# Patient Record
Sex: MALE
[De-identification: names, ages, dates, MRNs, and addresses within clinical notes are randomized per-mention and may not be internally consistent; named-entity substitution may affect disease eponyms.]

---

## 2019-06-18 PROBLEM — Z00.00 ENCOUNTER FOR PREVENTIVE HEALTH EXAMINATION: Status: ACTIVE | Noted: 2019-06-18

## 2019-06-24 ENCOUNTER — APPOINTMENT (OUTPATIENT)
Dept: MAMMOGRAPHY | Facility: CLINIC | Age: 49
End: 2019-06-24
Payer: COMMERCIAL

## 2019-06-24 ENCOUNTER — APPOINTMENT (OUTPATIENT)
Dept: ULTRASOUND IMAGING | Facility: CLINIC | Age: 49
End: 2019-06-24

## 2019-06-24 PROCEDURE — 77066 DX MAMMO INCL CAD BI: CPT

## 2019-06-24 PROCEDURE — 76641 ULTRASOUND BREAST COMPLETE: CPT | Mod: RT

## 2019-06-24 PROCEDURE — G0279: CPT

## 2019-08-05 ENCOUNTER — APPOINTMENT (OUTPATIENT)
Dept: BREAST CENTER | Facility: CLINIC | Age: 49
End: 2019-08-05
Payer: COMMERCIAL

## 2019-08-05 VITALS
SYSTOLIC BLOOD PRESSURE: 118 MMHG | TEMPERATURE: 97.6 F | HEART RATE: 65 BPM | WEIGHT: 215 LBS | BODY MASS INDEX: 30.78 KG/M2 | HEIGHT: 70 IN | DIASTOLIC BLOOD PRESSURE: 80 MMHG

## 2019-08-05 DIAGNOSIS — Z78.9 OTHER SPECIFIED HEALTH STATUS: ICD-10-CM

## 2019-08-05 DIAGNOSIS — N62 HYPERTROPHY OF BREAST: ICD-10-CM

## 2019-08-05 PROCEDURE — 99243 OFF/OP CNSLTJ NEW/EST LOW 30: CPT

## 2019-08-05 NOTE — DATA REVIEWED
[FreeTextEntry1] : 6/24/19 NFR, Carlos A DmmgT/R US: no priors, FG, no susp findings, breast tissue identified in R SA in a pattern consistent w gynecomastia. No susp findings to L.BR2\par 6/24/19 NFR R US: altered echotexture in SA consistent with gynecomastia. No susp mass. Palp abn needs to be managed clinically. BR2

## 2019-08-05 NOTE — HISTORY OF PRESENT ILLNESS
[FreeTextEntry1] : Pt is a 49 y/o male, here for initial consultation, referred by Dr. William Sousa. Pt is here with his wife Karena. States he noticed it 2 mos ago. However he has been trying to lose weight and has lost 40lbs since the beginning of the year.  \par No Rx, no herbal, nonsmoker, minimal alcohol, no drugs and no marijuana. Has children. Had recent blood tests with PCP an all neg. \par \par 6/24/19 NFR, Carlos A DmmgT/R US: no priors, FG, no susp findings, breast tissue identified in R SA in a pattern consistent w gynecomastia. No susp findings to L.BR2\par 6/24/19 NFR R US: altered echotexture in SA consistent with gynecomastia. No susp mass. Palp abn needs to be managed clinically. BR2

## 2019-08-05 NOTE — ASSESSMENT
[FreeTextEntry1] : Pt is a 49 y/o male, here for initial consultation, referred by Dr. William Sousa. Pt is here with his wife Karena. States he noticed it 2 mos ago. However he has been trying to lose weight and has lost 40lbs since the beginning of the year.  \par No Rx, no herbal, nonsmoker, minimal alcohol, no drugs and no marijuana. Has children. Had recent blood tests with PCP an all neg. \par \par 6/24/19 NFR, Carlos A DmmgT/R US: no priors, FG, no susp findings, breast tissue identified in R SA in a pattern consistent w gynecomastia. No susp findings to L.BR2\par 6/24/19 NFR R US: altered echotexture in SA consistent with gynecomastia. No susp mass. Palp abn needs to be managed clinically. BR2\par CBE: Right gynecomastia. \par Long discussion with pt and wife, Karena. Discussed usually idiopathic. Possible causes reviewed and pt does not have any of them. Discussed usually self limited but may take mos to year. Options are observation, recheck mmg and u.s in 6 mos for state of progression, Rx: nolvadex (side effects and may not be covered by insurance), symptomatic treatment with NSAIDs, and surgical excision.  They are thinking about excision and referred to Dr. Dimas or plastics as I do not perform gynecomastia surgery. \par Rx for 6 mos right mmg and u/s given to pt.

## 2019-08-05 NOTE — PHYSICAL EXAM
[Normocephalic] : normocephalic [Atraumatic] : atraumatic [Supple] : supple [No Supraclavicular Adenopathy] : no supraclavicular adenopathy [No Thyromegaly] : no thyromegaly [Examined in the supine and seated position] : examined in the supine and seated position [Asymmetrical] : asymmetrical [No dominant masses] : no dominant masses in right breast  [No dominant masses] : no dominant masses left breast [No Nipple Retraction] : no left nipple retraction [No Nipple Discharge] : no left nipple discharge [No Axillary Lymphadenopathy] : no left axillary lymphadenopathy [No Edema] : no edema [No Swelling] : no swelling [Full ROM] : full range of motion [No Ulceration] : no ulceration [No Rashes] : no rashes [de-identified] : Right gynecomastia, tender to palpation. No discrete masses.